# Patient Record
Sex: FEMALE | Race: OTHER | HISPANIC OR LATINO | Employment: FULL TIME | URBAN - METROPOLITAN AREA
[De-identification: names, ages, dates, MRNs, and addresses within clinical notes are randomized per-mention and may not be internally consistent; named-entity substitution may affect disease eponyms.]

---

## 2023-02-12 ENCOUNTER — APPOINTMENT (EMERGENCY)
Dept: CT IMAGING | Facility: HOSPITAL | Age: 33
End: 2023-02-12

## 2023-02-12 ENCOUNTER — HOSPITAL ENCOUNTER (EMERGENCY)
Facility: HOSPITAL | Age: 33
Discharge: HOME | End: 2023-02-12
Attending: INTERNAL MEDICINE

## 2023-02-12 VITALS
TEMPERATURE: 98.5 F | SYSTOLIC BLOOD PRESSURE: 132 MMHG | DIASTOLIC BLOOD PRESSURE: 78 MMHG | HEART RATE: 90 BPM | OXYGEN SATURATION: 100 % | RESPIRATION RATE: 18 BRPM | WEIGHT: 135.8 LBS
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
## 2023-02-12 DIAGNOSIS — S09.93XA FACIAL INJURY, INITIAL ENCOUNTER: ICD-10-CM

## 2023-02-12 DIAGNOSIS — S00.83XA CONTUSION OF FACE: ICD-10-CM

## 2023-02-12 LAB
EXT PREGNANCY TEST URINE: NEGATIVE
EXT. CONTROL: NORMAL

## 2023-02-12 RX ORDER — ACETAMINOPHEN 325 MG/1
975 TABLET ORAL ONCE
Status: COMPLETED | OUTPATIENT
Start: 2023-02-12 | End: 2023-02-12

## 2023-02-12 RX ORDER — LIDOCAINE 50 MG/G
1 PATCH TOPICAL ONCE
Status: DISCONTINUED | OUTPATIENT
Start: 2023-02-12 | End: 2023-02-12 | Stop reason: HOSPADM

## 2023-02-12 RX ORDER — IBUPROFEN 400 MG/1
600 TABLET ORAL EVERY 6 HOURS PRN
Qty: 30 TABLET | Refills: 0 | Status: SHIPPED | OUTPATIENT
Start: 2023-02-12

## 2023-02-12 RX ORDER — IBUPROFEN 600 MG/1
600 TABLET ORAL ONCE
Status: COMPLETED | OUTPATIENT
Start: 2023-02-12 | End: 2023-02-12

## 2023-02-12 RX ORDER — ACETAMINOPHEN 325 MG/1
TABLET ORAL
Status: COMPLETED
Start: 2023-02-12 | End: 2023-02-12

## 2023-02-12 RX ORDER — LIDOCAINE 50 MG/G
PATCH TOPICAL
Status: DISCONTINUED
Start: 2023-02-12 | End: 2023-02-12 | Stop reason: HOSPADM

## 2023-02-12 RX ADMIN — LIDOCAINE 1 PATCH: 50 PATCH TOPICAL at 01:10

## 2023-02-12 RX ADMIN — IBUPROFEN 600 MG: 600 TABLET, FILM COATED ORAL at 02:56

## 2023-02-12 RX ADMIN — ACETAMINOPHEN 975 MG: 325 TABLET ORAL at 01:11

## 2023-02-12 NOTE — ED NOTES
Patient seen by Dr Gene Mckinney with results of testing and discharge and follow up instructions reviewed with patient  Patient medicated with motrin prior to discharge       Brandie Gomez RN  02/12/23 8808
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
## 2023-10-16 ENCOUNTER — HOSPITAL ENCOUNTER (EMERGENCY)
Facility: HOSPITAL | Age: 33
Discharge: HOME | End: 2023-10-16
Attending: EMERGENCY MEDICINE

## 2023-10-16 ENCOUNTER — APPOINTMENT (EMERGENCY)
Dept: CT IMAGING | Facility: HOSPITAL | Age: 33
End: 2023-10-16

## 2023-10-16 DIAGNOSIS — R10.31 RIGHT LOWER QUADRANT ABDOMINAL PAIN: Primary | ICD-10-CM

## 2023-10-16 LAB
ANION GAP SERPL CALCULATED.3IONS-SCNC: 5 MMOL/L
BASOPHILS # BLD AUTO: 0.01 THOUSANDS/ÂΜL (ref 0–0.1)
BASOPHILS NFR BLD AUTO: 0 % (ref 0–1)
BILIRUB UR QL STRIP: NEGATIVE
BUN SERPL-MCNC: 12 MG/DL (ref 5–25)
CALCIUM SERPL-MCNC: 9.1 MG/DL (ref 8.4–10.2)
CHLORIDE SERPL-SCNC: 102 MMOL/L (ref 96–108)
CLARITY UR: CLEAR
CO2 SERPL-SCNC: 28 MMOL/L (ref 21–32)
COLOR UR: YELLOW
CREAT SERPL-MCNC: 0.56 MG/DL (ref 0.6–1.3)
EOSINOPHIL # BLD AUTO: 0.03 THOUSAND/ÂΜL (ref 0–0.61)
EOSINOPHIL NFR BLD AUTO: 0 % (ref 0–6)
ERYTHROCYTE [DISTWIDTH] IN BLOOD BY AUTOMATED COUNT: 11.9 % (ref 11.6–15.1)
EXT PREGNANCY TEST URINE: NEGATIVE
EXT. CONTROL: NORMAL
GFR SERPL CREATININE-BSD FRML MDRD: 123 ML/MIN/1.73SQ M
GLUCOSE SERPL-MCNC: 82 MG/DL (ref 65–140)
GLUCOSE UR STRIP-MCNC: NEGATIVE MG/DL
HCT VFR BLD AUTO: 40.2 % (ref 34.8–46.1)
HGB BLD-MCNC: 13.4 G/DL (ref 11.5–15.4)
HGB UR QL STRIP.AUTO: NEGATIVE
IMM GRANULOCYTES # BLD AUTO: 0.01 THOUSAND/UL (ref 0–0.2)
IMM GRANULOCYTES NFR BLD AUTO: 0 % (ref 0–2)
KETONES UR STRIP-MCNC: NEGATIVE MG/DL
LEUKOCYTE ESTERASE UR QL STRIP: NEGATIVE
LYMPHOCYTES # BLD AUTO: 2.21 THOUSANDS/ÂΜL (ref 0.6–4.47)
LYMPHOCYTES NFR BLD AUTO: 31 % (ref 14–44)
MCH RBC QN AUTO: 29.1 PG (ref 26.8–34.3)
MCHC RBC AUTO-ENTMCNC: 33.3 G/DL (ref 31.4–37.4)
MCV RBC AUTO: 87 FL (ref 82–98)
MONOCYTES # BLD AUTO: 0.53 THOUSAND/ÂΜL (ref 0.17–1.22)
MONOCYTES NFR BLD AUTO: 7 % (ref 4–12)
NEUTROPHILS # BLD AUTO: 4.36 THOUSANDS/ÂΜL (ref 1.85–7.62)
NEUTS SEG NFR BLD AUTO: 62 % (ref 43–75)
NITRITE UR QL STRIP: NEGATIVE
NRBC BLD AUTO-RTO: 0 /100 WBCS
PH UR STRIP.AUTO: 6.5 [PH]
PLATELET # BLD AUTO: 232 THOUSANDS/UL (ref 149–390)
PMV BLD AUTO: 10 FL (ref 8.9–12.7)
POTASSIUM SERPL-SCNC: 4.1 MMOL/L (ref 3.5–5.3)
PROT UR STRIP-MCNC: NEGATIVE MG/DL
RBC # BLD AUTO: 4.6 MILLION/UL (ref 3.81–5.12)
SODIUM SERPL-SCNC: 135 MMOL/L (ref 135–147)
SP GR UR STRIP.AUTO: 1.02 (ref 1–1.03)
UROBILINOGEN UR QL STRIP.AUTO: 0.2 E.U./DL
WBC # BLD AUTO: 7.15 THOUSAND/UL (ref 4.31–10.16)

## 2023-10-16 PROCEDURE — 80048 BASIC METABOLIC PNL TOTAL CA: CPT | Performed by: EMERGENCY MEDICINE

## 2023-10-16 PROCEDURE — 81003 URINALYSIS AUTO W/O SCOPE: CPT | Performed by: EMERGENCY MEDICINE

## 2023-10-16 PROCEDURE — 85025 COMPLETE CBC W/AUTO DIFF WBC: CPT | Performed by: EMERGENCY MEDICINE

## 2023-10-16 PROCEDURE — 36415 COLL VENOUS BLD VENIPUNCTURE: CPT | Performed by: EMERGENCY MEDICINE

## 2023-10-16 PROCEDURE — 81025 URINE PREGNANCY TEST: CPT | Performed by: EMERGENCY MEDICINE

## 2023-10-16 PROCEDURE — 74177 CT ABD & PELVIS W/CONTRAST: CPT

## 2023-10-16 PROCEDURE — G1004 CDSM NDSC: HCPCS

## 2023-10-16 PROCEDURE — 81514 NFCT DS BV&VAGINITIS DNA ALG: CPT | Performed by: EMERGENCY MEDICINE

## 2023-10-16 PROCEDURE — 87491 CHLMYD TRACH DNA AMP PROBE: CPT | Performed by: EMERGENCY MEDICINE

## 2023-10-16 PROCEDURE — 87591 N.GONORRHOEAE DNA AMP PROB: CPT | Performed by: EMERGENCY MEDICINE

## 2023-10-16 RX ORDER — ONDANSETRON 2 MG/ML
4 INJECTION INTRAMUSCULAR; INTRAVENOUS ONCE
Status: COMPLETED | OUTPATIENT
Start: 2023-10-16 | End: 2023-10-16

## 2023-10-16 RX ORDER — IBUPROFEN 600 MG/1
600 TABLET ORAL EVERY 6 HOURS PRN
Qty: 30 TABLET | Refills: 0 | Status: SHIPPED | OUTPATIENT
Start: 2023-10-16

## 2023-10-16 RX ORDER — KETOROLAC TROMETHAMINE 30 MG/ML
15 INJECTION, SOLUTION INTRAMUSCULAR; INTRAVENOUS ONCE
Status: COMPLETED | OUTPATIENT
Start: 2023-10-16 | End: 2023-10-16

## 2023-10-16 RX ADMIN — IOHEXOL 85 ML: 350 INJECTION, SOLUTION INTRAVENOUS at 19:16

## 2023-10-16 RX ADMIN — KETOROLAC TROMETHAMINE 15 MG: 30 INJECTION, SOLUTION INTRAMUSCULAR; INTRAVENOUS at 18:48

## 2023-10-16 RX ADMIN — ONDANSETRON 4 MG: 2 INJECTION INTRAMUSCULAR; INTRAVENOUS at 18:48

## 2023-10-16 NOTE — ED PROVIDER NOTES
History  Chief Complaint   Patient presents with    Abdominal Pain     Abdominal pain rlq x2 days. Reports nausea     46-year-old female presents to the emergency department for evaluation of abdominal pain. The patient is German speaking only and an  was used for translation. The patient reports over the past 2 days she has had constant right lower quadrant pain that she describes as moderate, nonradiating and sharp. She states she has associated nausea without vomiting. She has not taken any medications to treat her symptoms prior to arrival.  The patient also states that over the past several days she has had a white vaginal discharge. The patient reports that her last period was September 23, 2023 and was normal for her. No new sexual partners. She denies fever, chills, vomiting, sick contacts, recent travel, urinary symptoms or genital lesions. Prior to Admission Medications   Prescriptions Last Dose Informant Patient Reported? Taking?   ibuprofen (MOTRIN) 400 mg tablet   No No   Sig: Take 1.5 tablets (600 mg total) by mouth every 6 (six) hours as needed for mild pain      Facility-Administered Medications: None       Past Medical History:   Diagnosis Date    Asthma        History reviewed. No pertinent surgical history. History reviewed. No pertinent family history. I have reviewed and agree with the history as documented. E-Cigarette/Vaping    E-Cigarette Use Never User      E-Cigarette/Vaping Substances     Social History     Tobacco Use    Smoking status: Never    Smokeless tobacco: Never   Vaping Use    Vaping Use: Never used   Substance Use Topics    Alcohol use: Never    Drug use: Never       Review of Systems   Constitutional:  Negative for chills and fever. HENT:  Negative for ear pain and sore throat. Eyes:  Negative for pain and visual disturbance. Respiratory:  Negative for cough and shortness of breath.     Cardiovascular:  Negative for chest pain and palpitations. Gastrointestinal:  Positive for abdominal pain and nausea. Negative for vomiting. Genitourinary:  Positive for vaginal discharge. Negative for dysuria and hematuria. Musculoskeletal:  Negative for arthralgias and back pain. Skin:  Negative for color change and rash. Neurological:  Negative for seizures and syncope. All other systems reviewed and are negative. Physical Exam  Physical Exam  Vitals and nursing note reviewed. Constitutional:       General: She is not in acute distress. Appearance: She is well-developed. HENT:      Head: Normocephalic and atraumatic. Eyes:      Conjunctiva/sclera: Conjunctivae normal.   Cardiovascular:      Rate and Rhythm: Normal rate and regular rhythm. Heart sounds: No murmur heard. Pulmonary:      Effort: Pulmonary effort is normal. No respiratory distress. Breath sounds: Normal breath sounds. Abdominal:      Palpations: Abdomen is soft. Tenderness: There is abdominal tenderness in the right lower quadrant. There is no guarding or rebound. Musculoskeletal:         General: No swelling. Cervical back: Neck supple. Skin:     General: Skin is warm and dry. Capillary Refill: Capillary refill takes less than 2 seconds. Neurological:      Mental Status: She is alert.    Psychiatric:         Mood and Affect: Mood normal.         Vital Signs  ED Triage Vitals   Temperature Pulse Respirations Blood Pressure SpO2   10/16/23 1751 10/16/23 1751 10/16/23 1751 10/16/23 1751 10/16/23 1751   98.2 °F (36.8 °C) 79 12 118/83 98 %      Temp Source Heart Rate Source Patient Position - Orthostatic VS BP Location FiO2 (%)   10/16/23 1751 10/16/23 1751 10/16/23 1751 10/16/23 1751 --   Oral Monitor Sitting Left arm       Pain Score       10/16/23 1848       5           Vitals:    10/16/23 1751   BP: 118/83   Pulse: 79   Patient Position - Orthostatic VS: Sitting         Visual Acuity      ED Medications  Medications   ketorolac (TORADOL) injection 15 mg (15 mg Intravenous Given 10/16/23 1848)   ondansetron (ZOFRAN) injection 4 mg (4 mg Intravenous Given 10/16/23 1848)   iohexol (OMNIPAQUE) 350 MG/ML injection (MULTI-DOSE) 100 mL (85 mL Intravenous Given 10/16/23 1916)       Diagnostic Studies  Results Reviewed       Procedure Component Value Units Date/Time    Basic metabolic panel [[id number]]  (Abnormal) Collected: 10/16/23 1847    Lab Status: Final result Specimen: Blood from Arm, Left Updated: 10/16/23 1915     Sodium 135 mmol/L      Potassium 4.1 mmol/L      Chloride 102 mmol/L      CO2 28 mmol/L      ANION GAP 5 mmol/L      BUN 12 mg/dL      Creatinine 0.56 mg/dL      Glucose 82 mg/dL      Calcium 9.1 mg/dL      eGFR 123 ml/min/1.73sq m     Narrative:      [facility] guidelines for Chronic Kidney Disease (CKD):     Stage 1 with normal or high GFR (GFR > 90 mL/min/1.73 square meters)    Stage 2 Mild CKD (GFR = 60-89 mL/min/1.73 square meters)    Stage 3A Moderate CKD (GFR = 45-59 mL/min/1.73 square meters)    Stage 3B Moderate CKD (GFR = 30-44 mL/min/1.73 square meters)    Stage 4 Severe CKD (GFR = 15-29 mL/min/1.73 square meters)    Stage 5 End Stage CKD (GFR <15 mL/min/1.73 square meters)  Note: GFR calculation is accurate only with a steady state creatinine    UA w Reflex to Microscopic w Reflex to Culture [[id number]]  (Normal) Collected: 10/16/23 1847    Lab Status: Final result Specimen: Urine, Clean Catch Updated: 10/16/23 1902     Color, UA Yellow     Clarity, UA Clear     Specific Gravity, UA 1.025     pH, UA 6.5     Leukocytes, UA Negative     Nitrite, UA Negative     Protein, UA Negative mg/dl      Glucose, UA Negative mg/dl      Ketones, UA Negative mg/dl      Urobilinogen, UA 0.2 E.U./dl      Bilirubin, UA Negative     Occult Blood, UA Negative    Molecular Vaginal Panel [[id number]] Collected: 10/16/23 1853    Lab Status:  In process Specimen: Genital from Vaginal Updated: 10/16/23 1858    CBC and differential [[id number]] Collected: 10/16/23 1847    Lab Status: Final result Specimen: Blood from Arm, Left Updated: 10/16/23 1858     WBC 7.15 Thousand/uL      RBC 4.60 Million/uL      Hemoglobin 13.4 g/dL      Hematocrit 40.2 %      MCV 87 fL      MCH 29.1 pg      MCHC 33.3 g/dL      RDW 11.9 %      MPV 10.0 fL      Platelets 679 Thousands/uL      nRBC 0 /100 WBCs      Neutrophils Relative 62 %      Immat GRANS % 0 %      Lymphocytes Relative 31 %      Monocytes Relative 7 %      Eosinophils Relative 0 %      Basophils Relative 0 %      Neutrophils Absolute 4.36 Thousands/µL      Immature Grans Absolute 0.01 Thousand/uL      Lymphocytes Absolute 2.21 Thousands/µL      Monocytes Absolute 0.53 Thousand/µL      Eosinophils Absolute 0.03 Thousand/µL      Basophils Absolute 0.01 Thousands/µL     POCT pregnancy, urine [[id number]]  (Normal) Resulted: 10/16/23 1855    Lab Status: Final result Updated: 10/16/23 1856     EXT Preg Test, Ur Negative     Control Valid    Chlamydia/GC amplified DNA by PCR [[id number]] Collected: 10/16/23 1847    Lab Status: In process Specimen: Urine, Other Updated: 10/16/23 1855                   CT abdomen pelvis with contrast   Final Result by Ricco Tovar MD (10/16 1953)      No acute findings. Workstation performed: [id number]                    Procedures  Procedures         ED Course               SBIRT 20yo+      Flowsheet Row Most Recent Value   Initial Alcohol Screen: US AUDIT-C     1. How often do you have a drink containing alcohol? 0 Filed at: 10/16/2023 1754   2. How many drinks containing alcohol do you have on a typical day you are drinking? 0 Filed at: 10/16/2023 1754   3a. Male UNDER 65: How often do you have five or more drinks on one occasion? 0 Filed at: 10/16/2023 1754   3b. FEMALE Any Age, or MALE 65+: How often do you have 4 or more drinks on one occassion?  0 Filed at: 10/16/2023 1754   Audit-C Score 0 Filed at: 10/16/2023 1754   TIM: How many times in the past year have you. .. Used an illegal drug or used a prescription medication for non-medical reasons? Never Filed at: 10/16/2023 1754                      Medical Decision Making  52-year-old female presented to the emergency department for evaluation of abdominal pain. On arrival the patient was awake, alert, oriented and in no acute distress. Initial vital signs are within normal limits. On exam the patient had tenderness to palpation of her right lower quadrant. Physical exam was otherwise unremarkable. No peritoneal signs were present. The patient was treated with a dose of Zofran and Toradol. Blood work was reassuring. CT scan of the patient's abdomen and pelvis was ordered to evaluate for acute pathology including but not limited to obstruction, perforation, appendicitis, cyst.  CT scan with no acute findings. All diagnostic studies were discussed with the patient in detail. Patient reported significant improvement of symptoms on reevaluation. Patient is appropriate for discharge. Recommendation was made for the patient to follow-up with her PCP and OB/GYN for continued symptoms. Testing for STIs is pending. Return precautions were discussed. Patient agrees with the plan for discharge and feels comfortable to go home with proper f/u. Advised to return for worsening or additional problems. Diagnostic tests were reviewed and questions answered. Diagnosis, care plan and treatment options were discussed. The patient understands instructions and will follow up as directed. Amount and/or Complexity of Data Reviewed  Labs: ordered. Radiology: ordered. Risk  Prescription drug management.              Disposition  Final diagnoses:   Right lower quadrant abdominal pain     Time reflects when diagnosis was documented in both MDM as applicable and the Disposition within this note       Time User Action Codes Description Comment    10/16/2023  8:35 PM Joni Maldonado [R10.31] Right lower quadrant abdominal pain           ED Disposition       ED Disposition   Discharge    Condition   Stable    Date/Time   Mon Oct 16, 2023 2035    Comment   Kimberly Haney discharge to home/self care. Follow-up Information       Follow up With Specialties Details Why Contact Info Additional [address] [facility] Schedule an appointment as soon as possible for a visit   [address] [facility] [address] [facility] Gynecology Schedule an appointment as soon as possible for a visit   [address] [phone] [address], [address], [place], [address] [place] [address]    [address] Emergency Department Emergency Medicine Go to  If symptoms worsen [address] [phone] [address] [facility] Emergency Department, [address] [place] [address] Dr, [address] [facility]            Discharge Medication List as of 10/16/2023  8:41 PM        CONTINUE these medications which have CHANGED    Details   ibuprofen (MOTRIN) 600 mg tablet Take 1 tablet (600 mg total) by mouth every 6 (six) hours as needed for mild pain or moderate pain, Starting Mon 10/16/2023, Normal             No discharge procedures on file.     PDMP Review       None            ED Provider  Electronically Signed by             Clem Espino MD  10/17/23 0150

## 2023-10-17 VITALS
DIASTOLIC BLOOD PRESSURE: 83 MMHG | WEIGHT: 135.8 LBS | RESPIRATION RATE: 12 BRPM | TEMPERATURE: 98.2 F | OXYGEN SATURATION: 98 % | SYSTOLIC BLOOD PRESSURE: 118 MMHG | HEART RATE: 79 BPM

## 2023-10-17 LAB
C GLABRATA DNA VAG QL NAA+PROBE: NEGATIVE
C KRUSEI DNA VAG QL NAA+PROBE: NEGATIVE
C TRACH DNA SPEC QL NAA+PROBE: NEGATIVE
CANDIDA SP 6 PNL VAG NAA+PROBE: POSITIVE
N GONORRHOEA DNA SPEC QL NAA+PROBE: NEGATIVE
T VAGINALIS DNA VAG QL NAA+PROBE: NEGATIVE
VAGINOSIS/ITIS DNA PNL VAG PROBE+SIG AMP: NEGATIVE

## 2024-01-10 ENCOUNTER — HOSPITAL ENCOUNTER (EMERGENCY)
Facility: HOSPITAL | Age: 34
Discharge: HOME | End: 2024-01-10
Attending: EMERGENCY MEDICINE

## 2024-01-10 ENCOUNTER — APPOINTMENT (EMERGENCY)
Dept: RADIOLOGY | Facility: HOSPITAL | Age: 34
End: 2024-01-10

## 2024-01-10 VITALS
TEMPERATURE: 98.1 F | DIASTOLIC BLOOD PRESSURE: 83 MMHG | HEART RATE: 98 BPM | SYSTOLIC BLOOD PRESSURE: 144 MMHG | RESPIRATION RATE: 18 BRPM | OXYGEN SATURATION: 96 %

## 2024-01-10 DIAGNOSIS — J06.9 VIRAL URI WITH COUGH: Primary | ICD-10-CM

## 2024-01-10 LAB
FLUAV RNA RESP QL NAA+PROBE: NEGATIVE
FLUBV RNA RESP QL NAA+PROBE: NEGATIVE
RSV RNA RESP QL NAA+PROBE: NEGATIVE
SARS-COV-2 RNA RESP QL NAA+PROBE: NEGATIVE

## 2024-01-10 PROCEDURE — 71045 X-RAY EXAM CHEST 1 VIEW: CPT

## 2024-01-10 PROCEDURE — 99283 EMERGENCY DEPT VISIT LOW MDM: CPT

## 2024-01-10 PROCEDURE — 0241U HB NFCT DS VIR RESP RNA 4 TRGT: CPT | Performed by: EMERGENCY MEDICINE

## 2024-01-10 PROCEDURE — 99284 EMERGENCY DEPT VISIT MOD MDM: CPT | Performed by: EMERGENCY MEDICINE

## 2024-01-10 RX ORDER — ALBUTEROL SULFATE 90 UG/1
2 AEROSOL, METERED RESPIRATORY (INHALATION) EVERY 4 HOURS PRN
Qty: 6.7 G | Refills: 0 | Status: SHIPPED | OUTPATIENT
Start: 2024-01-10

## 2024-01-10 RX ORDER — IPRATROPIUM BROMIDE AND ALBUTEROL SULFATE 2.5; .5 MG/3ML; MG/3ML
3 SOLUTION RESPIRATORY (INHALATION)
Status: DISCONTINUED | OUTPATIENT
Start: 2024-01-10 | End: 2024-01-10 | Stop reason: HOSPADM

## 2024-01-10 RX ORDER — GUAIFENESIN/DEXTROMETHORPHAN 100-10MG/5
10 SYRUP ORAL ONCE
Status: COMPLETED | OUTPATIENT
Start: 2024-01-10 | End: 2024-01-10

## 2024-01-10 RX ORDER — FLUTICASONE PROPIONATE 50 MCG
1 SPRAY, SUSPENSION (ML) NASAL DAILY
Status: DISCONTINUED | OUTPATIENT
Start: 2024-01-10 | End: 2024-01-10 | Stop reason: HOSPADM

## 2024-01-10 RX ORDER — GUAIFENESIN/DEXTROMETHORPHAN 100-10MG/5
5 SYRUP ORAL 3 TIMES DAILY PRN
Qty: 118 ML | Refills: 0 | Status: SHIPPED | OUTPATIENT
Start: 2024-01-10

## 2024-01-10 RX ADMIN — FLUTICASONE PROPIONATE 1 SPRAY: 50 SPRAY, METERED NASAL at 11:45

## 2024-01-10 RX ADMIN — GUAIFENESIN AND DEXTROMETHORPHAN 10 ML: 100; 10 SYRUP ORAL at 11:45

## 2024-01-10 NOTE — Clinical Note
Kimberly Haney was seen and treated in our emergency department on 1/10/2024.                Diagnosis:     Kimberly  may return to school on return date.    She may return on this date: 01/12/2024         If you have any questions or concerns, please don't hesitate to call.      Sharon Alvarado, DO    ______________________________           _______________          _______________  Hospital Representative                              Date                                Time

## 2024-01-10 NOTE — ED PROVIDER NOTES
History  Chief Complaint   Patient presents with    Cough     Pt presents to the ed with a cough for the past four days, reports taking tylenol and other medications for cough with no relief      33-year-old female comes in for evaluation of cough.  States her symptoms began approximately 4 days ago.  She has been taking over-the-counter medications without relief.  Patient also has a history of asthma and states her inhaler has run out.  Denies any fever at home.  No known sick contacts.  Denies any nausea or vomiting chills or chest pain.      History provided by:  Patient   used: Yes    Cough  Cough characteristics:  Non-productive  Sputum characteristics:  Nondescript  Severity:  Moderate  Onset quality:  Sudden  Duration:  4 days  Timing:  Constant  Progression:  Worsening  Chronicity:  New  Smoker: no    Context: upper respiratory infection    Ineffective treatments:  None tried  Associated symptoms: wheezing    Associated symptoms: no chest pain, no chills, no ear pain, no fever, no myalgias, no rash, no shortness of breath and no sore throat    Risk factors: recent infection        Prior to Admission Medications   Prescriptions Last Dose Informant Patient Reported? Taking?   ibuprofen (MOTRIN) 600 mg tablet   No No   Sig: Take 1 tablet (600 mg total) by mouth every 6 (six) hours as needed for mild pain or moderate pain      Facility-Administered Medications: None       Past Medical History:   Diagnosis Date    Asthma        History reviewed. No pertinent surgical history.    History reviewed. No pertinent family history.  I have reviewed and agree with the history as documented.    E-Cigarette/Vaping    E-Cigarette Use Never User      E-Cigarette/Vaping Substances     Social History     Tobacco Use    Smoking status: Never    Smokeless tobacco: Never   Vaping Use    Vaping status: Never Used   Substance Use Topics    Alcohol use: Never    Drug use: Never       Review of Systems    Constitutional:  Negative for chills and fever.   HENT:  Negative for ear pain and sore throat.    Eyes:  Negative for pain and visual disturbance.   Respiratory:  Positive for cough and wheezing. Negative for shortness of breath.    Cardiovascular:  Negative for chest pain and palpitations.   Gastrointestinal:  Negative for abdominal pain and vomiting.   Genitourinary:  Negative for dysuria and hematuria.   Musculoskeletal:  Negative for arthralgias, back pain and myalgias.   Skin:  Negative for color change and rash.   Neurological:  Negative for seizures and syncope.   All other systems reviewed and are negative.      Physical Exam  Physical Exam  Vitals and nursing note reviewed.   Constitutional:       General: She is not in acute distress.     Appearance: She is well-developed.   HENT:      Head: Normocephalic and atraumatic.   Eyes:      Conjunctiva/sclera: Conjunctivae normal.   Cardiovascular:      Rate and Rhythm: Normal rate and regular rhythm.      Heart sounds: No murmur heard.  Pulmonary:      Effort: Pulmonary effort is normal. No respiratory distress.      Breath sounds: Wheezing present.   Abdominal:      Palpations: Abdomen is soft.      Tenderness: There is no abdominal tenderness.   Musculoskeletal:         General: No swelling.      Cervical back: Neck supple.   Skin:     General: Skin is warm and dry.      Capillary Refill: Capillary refill takes less than 2 seconds.   Neurological:      Mental Status: She is alert.   Psychiatric:         Mood and Affect: Mood normal.         Vital Signs  ED Triage Vitals [01/10/24 1113]   Temperature Pulse Respirations Blood Pressure SpO2   98.1 °F (36.7 °C) 98 18 144/83 96 %      Temp Source Heart Rate Source Patient Position - Orthostatic VS BP Location FiO2 (%)   Oral Monitor Sitting Right arm --      Pain Score       --           Vitals:    01/10/24 1113   BP: 144/83   Pulse: 98   Patient Position - Orthostatic VS: Sitting         Visual Acuity      ED  Medications  Medications   dextromethorphan-guaiFENesin (ROBITUSSIN DM) oral syrup 10 mL (10 mL Oral Given 1/10/24 1145)       Diagnostic Studies  Results Reviewed       Procedure Component Value Units Date/Time    FLU/RSV/COVID - if FLU/RSV clinically relevant [[id number]]  (Normal) Collected: 01/10/24 1137    Lab Status: Final result Specimen: Nares from Nose Updated: 01/10/24 1234     SARS-CoV-2 Negative     INFLUENZA A PCR Negative     INFLUENZA B PCR Negative     RSV PCR Negative    Narrative:      FOR PEDIATRIC PATIENTS - copy/paste COVID Guidelines URL to browser: [email or web address]    SARS-CoV-2 assay is a Nucleic Acid Amplification assay intended for the  qualitative detection of nucleic acid from SARS-CoV-2 in nasopharyngeal  swabs. Results are for the presumptive identification of SARS-CoV-2 RNA.    Positive results are indicative of infection with SARS-CoV-2, the virus  causing COVID-19, but do not rule out bacterial infection or co-infection  with other viruses. Laboratories within the [place] and its  territories are required to report all positive results to the appropriate  public health authorities. Negative results do not preclude SARS-CoV-2  infection and should not be used as the sole basis for treatment or other  patient management decisions. Negative results must be combined with  clinical observations, patient history, and epidemiological information.  This test has not been FDA cleared or approved.    This test has been authorized by FDA under an Emergency Use Authorization  (EUA). This test is only authorized for the duration of time the  declaration that circumstances exist justifying the authorization of the  emergency use of an in vitro diagnostic tests for detection of SARS-CoV-2  virus and/or diagnosis of COVID-19 infection under section 564(b)(1) of  the Act, 21 U.S.C. 360bbb-3(b)(1), unless the authorization is  terminated  or revoked sooner. The test has been validated but independent review by FDA  and CLIA is pending.    Test performed using [employer] ([employer]) GeneXpert: This RT-PCR assay targets N2,  a region unique to SARS-CoV-2. A conserved region in the E-gene was chosen  for pan-Sarbecovirus detection which includes SARS-CoV-2.    According to CMS-2020-01-R, this platform meets the definition of high-throughput technology.                   XR chest 1 view portable   Final Result by Vernon Salmeron DO (01/10 1455)      No acute cardiopulmonary disease.                  Resident: DELFIN HORTON I, the attending radiologist, have reviewed the images and agree with the final report above.      Workstation performed: [id number]                    Procedures  Procedures         ED Course                               SBIRT 22yo+      Flowsheet Row Most Recent Value   Initial Alcohol Screen: US AUDIT-C     1. How often do you have a drink containing alcohol? 0 Filed at: 01/10/2024 1112   2. How many drinks containing alcohol do you have on a typical day you are drinking?  0 Filed at: 01/10/2024 1112   3a. Male UNDER 65: How often do you have five or more drinks on one occasion? 0 Filed at: 01/10/2024 1112   3b. FEMALE Any Age, or MALE 65+: How often do you have 4 or more drinks on one occassion? 0 Filed at: 01/10/2024 1112   Audit-C Score 0 Filed at: 01/10/2024 1112   TIM: How many times in the past year have you...    Used an illegal drug or used a prescription medication for non-medical reasons? Never Filed at: 01/10/2024 1112                      Medical Decision Making  Differential diagnosis includes but is not limited to URI, viral illness such as COVID flu or RSV, pneumonia, acute bronchitis, asthma exacerbation    Problems Addressed:  Viral URI with cough: acute illness or injury    Amount and/or Complexity of Data Reviewed  Labs:  Decision-making details documented in ED Course.  Radiology: ordered and  independent interpretation performed.     Details: Chest x-ray within normal limits no pneumonia appreciated    Risk  OTC drugs.  Prescription drug management.  Risk Details: Patient may take Motrin or Tylenol as needed for pain or fever.  Patient given albuterol inhaler and cough medicine and to follow-up with PCP             Disposition  Final diagnoses:   Viral URI with cough     Time reflects when diagnosis was documented in both MDM as applicable and the Disposition within this note       Time User Action Codes Description Comment    1/10/2024 12:23 PM Sharon Alvarado Add [J06.9] Viral URI with cough           ED Disposition       ED Disposition   Discharge    Condition   Stable    Date/Time   Wed Miguel 10, 2024 1253    Comment   Kimberly Haney discharge to home/self care.                   Follow-up Information       Follow up With Specialties Details Why Contact Info    [facility] Schedule an appointment as soon as possible for a visit   [address] [place] [address]  [phone]              Discharge Medication List as of 1/10/2024 12:53 PM        START taking these medications    Details   albuterol (ProAir HFA) 90 mcg/act inhaler Inhale 2 puffs every 4 (four) hours as needed for wheezing, Starting Wed 1/10/2024, Normal      dextromethorphan-guaiFENesin (ROBITUSSIN DM)  mg/5 mL syrup Take 5 mL by mouth 3 (three) times a day as needed for cough, Starting Wed 1/10/2024, Normal           CONTINUE these medications which have NOT CHANGED    Details   ibuprofen (MOTRIN) 600 mg tablet Take 1 tablet (600 mg total) by mouth every 6 (six) hours as needed for mild pain or moderate pain, Starting Mon 10/16/2023, Normal             No discharge procedures on file.    PDMP Review       None            ED Provider  Electronically Signed by             Sharon Alvarado,   01/10/24 8681

## 2024-07-23 ENCOUNTER — APPOINTMENT (EMERGENCY)
Dept: ULTRASOUND IMAGING | Facility: HOSPITAL | Age: 34
End: 2024-07-23
Payer: COMMERCIAL

## 2024-07-23 ENCOUNTER — HOSPITAL ENCOUNTER (EMERGENCY)
Facility: HOSPITAL | Age: 34
Discharge: HOME | End: 2024-07-23
Attending: INTERNAL MEDICINE
Payer: COMMERCIAL

## 2024-07-23 VITALS
DIASTOLIC BLOOD PRESSURE: 76 MMHG | OXYGEN SATURATION: 98 % | SYSTOLIC BLOOD PRESSURE: 131 MMHG | RESPIRATION RATE: 14 BRPM | HEART RATE: 103 BPM | WEIGHT: 139.4 LBS | TEMPERATURE: 98.2 F

## 2024-07-23 DIAGNOSIS — R10.2 PELVIC PAIN: Primary | ICD-10-CM

## 2024-07-23 LAB
ALBUMIN SERPL BCG-MCNC: 4.6 G/DL (ref 3.5–5)
ALP SERPL-CCNC: 39 U/L (ref 34–104)
ALT SERPL W P-5'-P-CCNC: 10 U/L (ref 7–52)
ANION GAP SERPL CALCULATED.3IONS-SCNC: 9 MMOL/L (ref 4–13)
AST SERPL W P-5'-P-CCNC: 16 U/L (ref 13–39)
BASOPHILS # BLD AUTO: 0.02 THOUSANDS/ÂΜL (ref 0–0.1)
BASOPHILS NFR BLD AUTO: 0 % (ref 0–1)
BILIRUB SERPL-MCNC: 0.43 MG/DL (ref 0.2–1)
BILIRUB UR QL STRIP: NEGATIVE
BUN SERPL-MCNC: 10 MG/DL (ref 5–25)
CALCIUM SERPL-MCNC: 9.4 MG/DL (ref 8.4–10.2)
CHLORIDE SERPL-SCNC: 101 MMOL/L (ref 96–108)
CLARITY UR: CLEAR
CO2 SERPL-SCNC: 26 MMOL/L (ref 21–32)
COLOR UR: YELLOW
CREAT SERPL-MCNC: 0.63 MG/DL (ref 0.6–1.3)
EOSINOPHIL # BLD AUTO: 0.01 THOUSAND/ÂΜL (ref 0–0.61)
EOSINOPHIL NFR BLD AUTO: 0 % (ref 0–6)
ERYTHROCYTE [DISTWIDTH] IN BLOOD BY AUTOMATED COUNT: 12 % (ref 11.6–15.1)
EXT PREGNANCY TEST URINE: NEGATIVE
EXT. CONTROL: NORMAL
GFR SERPL CREATININE-BSD FRML MDRD: 118 ML/MIN/1.73SQ M
GLUCOSE SERPL-MCNC: 90 MG/DL (ref 65–140)
GLUCOSE UR STRIP-MCNC: NEGATIVE MG/DL
HCT VFR BLD AUTO: 44.4 % (ref 34.8–46.1)
HGB BLD-MCNC: 14.5 G/DL (ref 11.5–15.4)
HGB UR QL STRIP.AUTO: NEGATIVE
IMM GRANULOCYTES # BLD AUTO: 0.03 THOUSAND/UL (ref 0–0.2)
IMM GRANULOCYTES NFR BLD AUTO: 0 % (ref 0–2)
KETONES UR STRIP-MCNC: NEGATIVE MG/DL
LEUKOCYTE ESTERASE UR QL STRIP: NEGATIVE
LYMPHOCYTES # BLD AUTO: 1.27 THOUSANDS/ÂΜL (ref 0.6–4.47)
LYMPHOCYTES NFR BLD AUTO: 11 % (ref 14–44)
MCH RBC QN AUTO: 29.4 PG (ref 26.8–34.3)
MCHC RBC AUTO-ENTMCNC: 32.7 G/DL (ref 31.4–37.4)
MCV RBC AUTO: 90 FL (ref 82–98)
MONOCYTES # BLD AUTO: 0.6 THOUSAND/ÂΜL (ref 0.17–1.22)
MONOCYTES NFR BLD AUTO: 5 % (ref 4–12)
NEUTROPHILS # BLD AUTO: 9.76 THOUSANDS/ÂΜL (ref 1.85–7.62)
NEUTS SEG NFR BLD AUTO: 84 % (ref 43–75)
NITRITE UR QL STRIP: NEGATIVE
NRBC BLD AUTO-RTO: 0 /100 WBCS
PH UR STRIP.AUTO: 6.5 [PH]
PLATELET # BLD AUTO: 255 THOUSANDS/UL (ref 149–390)
PMV BLD AUTO: 9.6 FL (ref 8.9–12.7)
POTASSIUM SERPL-SCNC: 4 MMOL/L (ref 3.5–5.3)
PROT SERPL-MCNC: 8 G/DL (ref 6.4–8.4)
PROT UR STRIP-MCNC: NEGATIVE MG/DL
RBC # BLD AUTO: 4.93 MILLION/UL (ref 3.81–5.12)
SODIUM SERPL-SCNC: 136 MMOL/L (ref 135–147)
SP GR UR STRIP.AUTO: 1.01 (ref 1–1.03)
UROBILINOGEN UR QL STRIP.AUTO: 0.2 E.U./DL
WBC # BLD AUTO: 11.69 THOUSAND/UL (ref 4.31–10.16)

## 2024-07-23 PROCEDURE — 96361 HYDRATE IV INFUSION ADD-ON: CPT

## 2024-07-23 PROCEDURE — 81003 URINALYSIS AUTO W/O SCOPE: CPT | Performed by: INTERNAL MEDICINE

## 2024-07-23 PROCEDURE — 36415 COLL VENOUS BLD VENIPUNCTURE: CPT | Performed by: INTERNAL MEDICINE

## 2024-07-23 PROCEDURE — 76856 US EXAM PELVIC COMPLETE: CPT

## 2024-07-23 PROCEDURE — 81025 URINE PREGNANCY TEST: CPT | Performed by: INTERNAL MEDICINE

## 2024-07-23 PROCEDURE — 99284 EMERGENCY DEPT VISIT MOD MDM: CPT

## 2024-07-23 PROCEDURE — 80053 COMPREHEN METABOLIC PANEL: CPT | Performed by: INTERNAL MEDICINE

## 2024-07-23 PROCEDURE — 96374 THER/PROPH/DIAG INJ IV PUSH: CPT

## 2024-07-23 PROCEDURE — 85025 COMPLETE CBC W/AUTO DIFF WBC: CPT | Performed by: INTERNAL MEDICINE

## 2024-07-23 PROCEDURE — 76830 TRANSVAGINAL US NON-OB: CPT

## 2024-07-23 PROCEDURE — 99284 EMERGENCY DEPT VISIT MOD MDM: CPT | Performed by: INTERNAL MEDICINE

## 2024-07-23 RX ORDER — KETOROLAC TROMETHAMINE 30 MG/ML
15 INJECTION, SOLUTION INTRAMUSCULAR; INTRAVENOUS ONCE
Status: DISCONTINUED | OUTPATIENT
Start: 2024-07-23 | End: 2024-07-23 | Stop reason: HOSPADM

## 2024-07-23 RX ORDER — KETOROLAC TROMETHAMINE 30 MG/ML
15 INJECTION, SOLUTION INTRAMUSCULAR; INTRAVENOUS ONCE
Status: COMPLETED | OUTPATIENT
Start: 2024-07-23 | End: 2024-07-23

## 2024-07-23 RX ADMIN — SODIUM CHLORIDE 1000 ML: 0.9 INJECTION, SOLUTION INTRAVENOUS at 08:57

## 2024-07-23 RX ADMIN — KETOROLAC TROMETHAMINE 15 MG: 30 INJECTION, SOLUTION INTRAMUSCULAR at 11:26

## 2024-07-23 NOTE — ED PROVIDER NOTES
History  Chief Complaint   Patient presents with    Abdominal Pain     Abdominal pain in stomach and lower belly beginning Sunday.  LMP 7/7.  Reports pain with urination     This is a 34 years old came for having abdominal pain for 2 days.  Patient stated the abdominal pain is in the lower part and going into the suprapubic area.  Has no fever, vomiting, diarrhea.  Last BM was yesterday morning.  Patient denies any urinary symptoms.  Patient denies vaginal bleeding or discharge.  LMP 7/11/2024.  Patient is stated she has regular menstruation.  Patient denies previous abdominal surgery.  Last meal was last night.  Patient has history of asthma.  Patient denies any flank pain.  Patient denies smoking but she drinks alcohol socially.        Prior to Admission Medications   Prescriptions Last Dose Informant Patient Reported? Taking?   albuterol (ProAir HFA) 90 mcg/act inhaler   No No   Sig: Inhale 2 puffs every 4 (four) hours as needed for wheezing   dextromethorphan-guaiFENesin (ROBITUSSIN DM)  mg/5 mL syrup   No No   Sig: Take 5 mL by mouth 3 (three) times a day as needed for cough   ibuprofen (MOTRIN) 600 mg tablet   No No   Sig: Take 1 tablet (600 mg total) by mouth every 6 (six) hours as needed for mild pain or moderate pain      Facility-Administered Medications: None       Past Medical History:   Diagnosis Date    Asthma        History reviewed. No pertinent surgical history.    History reviewed. No pertinent family history.  I have reviewed and agree with the history as documented.    E-Cigarette/Vaping    E-Cigarette Use Never User      E-Cigarette/Vaping Substances     Social History     Tobacco Use    Smoking status: Never    Smokeless tobacco: Never   Vaping Use    Vaping status: Never Used   Substance Use Topics    Alcohol use: Never    Drug use: Never       Review of Systems   Constitutional:  Negative for chills and fever.   HENT:  Negative for ear pain, sinus pressure, sinus pain, sneezing and sore  throat.    Eyes:  Negative for pain and visual disturbance.   Respiratory:  Negative for cough and shortness of breath.    Cardiovascular:  Negative for chest pain and palpitations.   Gastrointestinal:  Positive for abdominal pain. Negative for vomiting.   Endocrine: Negative for polydipsia, polyphagia and polyuria.   Genitourinary:  Positive for pelvic pain. Negative for difficulty urinating, dysuria, frequency, hematuria, vaginal bleeding, vaginal discharge and vaginal pain.   Musculoskeletal:  Negative for arthralgias and back pain.   Skin:  Negative for color change and rash.   Neurological:  Negative for seizures and syncope.   All other systems reviewed and are negative.      Physical Exam  Physical Exam  Vitals and nursing note reviewed.   Constitutional:       General: She is not in acute distress.     Appearance: She is well-developed. She is not ill-appearing, toxic-appearing or diaphoretic.   HENT:      Head: Normocephalic and atraumatic.   Eyes:      General: No scleral icterus.     Extraocular Movements: Extraocular movements intact.      Conjunctiva/sclera: Conjunctivae normal.   Cardiovascular:      Rate and Rhythm: Normal rate and regular rhythm.      Heart sounds: No murmur heard.     No friction rub. No gallop.   Pulmonary:      Effort: Pulmonary effort is normal. No respiratory distress.      Breath sounds: Normal breath sounds. No wheezing, rhonchi or rales.   Chest:      Chest wall: No tenderness.   Abdominal:      General: Abdomen is flat. Bowel sounds are normal. There is no distension. There are no signs of injury.      Palpations: Abdomen is soft. There is no shifting dullness, fluid wave, hepatomegaly, splenomegaly, mass or pulsatile mass.      Tenderness: There is abdominal tenderness in the suprapubic area. There is no right CVA tenderness, left CVA tenderness, guarding or rebound. Negative signs include Hopson's sign, Rovsing's sign, McBurney's sign, psoas sign and obturator sign.       Hernia: No hernia is present.   Musculoskeletal:         General: No swelling.      Cervical back: Neck supple.   Skin:     General: Skin is warm and dry.      Capillary Refill: Capillary refill takes less than 2 seconds.   Neurological:      Mental Status: She is alert and oriented to person, place, and time.   Psychiatric:         Mood and Affect: Mood normal.         Behavior: Behavior normal.         Vital Signs  ED Triage Vitals   Temperature Pulse Respirations Blood Pressure SpO2   07/23/24 0832 07/23/24 0832 07/23/24 0832 07/23/24 0832 07/23/24 0832   98.2 °F (36.8 °C) 103 14 131/76 98 %      Temp Source Heart Rate Source Patient Position - Orthostatic VS BP Location FiO2 (%)   07/23/24 0832 07/23/24 0832 -- -- --   Oral Monitor         Pain Score       07/23/24 1126       9           Vitals:    07/23/24 0832   BP: 131/76   Pulse: 103         Visual Acuity      ED Medications  Medications   sodium chloride 0.9 % bolus 1,000 mL (0 mL Intravenous Stopped 7/23/24 1122)   ketorolac (TORADOL) injection 15 mg (15 mg Intravenous Given 7/23/24 1126)       Diagnostic Studies  Results Reviewed       Procedure Component Value Units Date/Time    Comprehensive metabolic panel [[id number]] Collected: 07/23/24 0857    Lab Status: Final result Specimen: Blood from Arm, Right Updated: 07/23/24 0923     Sodium 136 mmol/L      Potassium 4.0 mmol/L      Chloride 101 mmol/L      CO2 26 mmol/L      ANION GAP 9 mmol/L      BUN 10 mg/dL      Creatinine 0.63 mg/dL      Glucose 90 mg/dL      Calcium 9.4 mg/dL      AST 16 U/L      ALT 10 U/L      Alkaline Phosphatase 39 U/L      Total Protein 8.0 g/dL      Albumin 4.6 g/dL      Total Bilirubin 0.43 mg/dL      eGFR 118 ml/min/1.73sq m     Narrative:      National Kidney Disease Foundation guidelines for Chronic Kidney Disease (CKD):     Stage 1 with normal or high GFR (GFR > 90 mL/min/1.73 square meters)    Stage 2 Mild CKD (GFR = 60-89 mL/min/1.73 square meters)    Stage 3A Moderate CKD  (GFR = 45-59 mL/min/1.73 square meters)    Stage 3B Moderate CKD (GFR = 30-44 mL/min/1.73 square meters)    Stage 4 Severe CKD (GFR = 15-29 mL/min/1.73 square meters)    Stage 5 End Stage CKD (GFR <15 mL/min/1.73 square meters)  Note: GFR calculation is accurate only with a steady state creatinine    UA w Reflex to Microscopic w Reflex to Culture [[id number]]  (Normal) Collected: 07/23/24 0859    Lab Status: Final result Specimen: Urine, Clean Catch Updated: 07/23/24 0908     Color, UA Yellow     Clarity, UA Clear     Specific Gravity, UA 1.010     pH, UA 6.5     Leukocytes, UA Negative     Nitrite, UA Negative     Protein, UA Negative mg/dl      Glucose, UA Negative mg/dl      Ketones, UA Negative mg/dl      Urobilinogen, UA 0.2 E.U./dl      Bilirubin, UA Negative     Occult Blood, UA Negative    POCT pregnancy, urine [[id number]]  (Normal) Resulted: 07/23/24 0906    Lab Status: Final result Updated: 07/23/24 0906     EXT Preg Test, Ur Negative     Control Valid    CBC and differential [[id number]]  (Abnormal) Collected: 07/23/24 0857    Lab Status: Final result Specimen: Blood from Arm, Right Updated: 07/23/24 0904     WBC 11.69 Thousand/uL      RBC 4.93 Million/uL      Hemoglobin 14.5 g/dL      Hematocrit 44.4 %      MCV 90 fL      MCH 29.4 pg      MCHC 32.7 g/dL      RDW 12.0 %      MPV 9.6 fL      Platelets 255 Thousands/uL      nRBC 0 /100 WBCs      Segmented % 84 %      Immature Grans % 0 %      Lymphocytes % 11 %      Monocytes % 5 %      Eosinophils Relative 0 %      Basophils Relative 0 %      Absolute Neutrophils 9.76 Thousands/µL      Absolute Immature Grans 0.03 Thousand/uL      Absolute Lymphocytes 1.27 Thousands/µL      Absolute Monocytes 0.60 Thousand/µL      Eosinophils Absolute 0.01 Thousand/µL      Basophils Absolute 0.02 Thousands/µL                    US pelvis complete w transvaginal   Final Result by Oracio Magana MD (07/23 1039)      Normal exam.                           Workstation  performed: [id number]                    Procedures  Procedures         ED Course                                 SBIRT 22yo+      Flowsheet Row Most Recent Value   Initial Alcohol Screen: US AUDIT-C     1. How often do you have a drink containing alcohol? 0 Filed at: 07/23/2024 0834   2. How many drinks containing alcohol do you have on a typical day you are drinking?  0 Filed at: 07/23/2024 0834   3a. Male UNDER 65: How often do you have five or more drinks on one occasion? 0 Filed at: 07/23/2024 0834   3b. FEMALE Any Age, or MALE 65+: How often do you have 4 or more drinks on one occassion? 0 Filed at: 07/23/2024 0834   Audit-C Score 0 Filed at: 07/23/2024 0834   TIM: How many times in the past year have you...    Used an illegal drug or used a prescription medication for non-medical reasons? Never Filed at: 07/23/2024 0834                      Medical Decision Making  This is a 34 years old came for having abdominal pain for 2 days.  Patient denies any fever, vomiting, diarrhea.  Last BM was yesterday morning.  Patient denies any urinary symptoms.  Patient denies vaginal bleeding or discharge.   last BM was yesterday morning.  Patient denies any urinary symptoms.  Patient denies vaginal bleeding or discharge.  LMP 7/11/2024.  Patient has regular menstruation.  Patient has no previous abdominal surgery.  Last meal was last night.  Patient takes no medication.  Physical exam is pertinent for having suprapubic tenderness.  Reviewing the labs including CBC, CMP came back within normal limits.  UA is negative for infection.  Pregnancy test is negative.  US pelvis, TV; normal exam no acute abnormal findings.  Patient received Toradol injection and felt much better at this time again discharge patient home and follow-up with [facility].  All question concerns of patient have been fully addressed.  Differential diagnosis include but not limited to; ovarian cyst, ovarian torsion, cystitis, endometritis.    Amount and/or  Complexity of Data Reviewed  Labs: ordered.     Details: Reviewing the labs including CBC, CMP came back within normal limits.  UA is negative for infection.  Pregnancy test is negative.    Radiology: ordered.     Details: US pelvis, TV; no acute abnormal findings normal US.    Risk  Prescription drug management.                 Disposition  Final diagnoses:   Pelvic pain     Time reflects when diagnosis was documented in both MDM as applicable and the Disposition within this note       Time User Action Codes Description Comment    7/23/2024 11:44 AM Kim Tapia Add [R10.2] Pelvic pain           ED Disposition       ED Disposition   Discharge    Condition   Stable    Date/Time   Tue Jul 23, 2024 1143    Comment   Kimberly Haney discharge to home/self care.                   Follow-up Information       Follow up With Specialties Details Why Contact Info    [facility] In 1 week  [address] [address]  [phone]              Discharge Medication List as of 7/23/2024 11:45 AM        CONTINUE these medications which have NOT CHANGED    Details   albuterol (ProAir HFA) 90 mcg/act inhaler Inhale 2 puffs every 4 (four) hours as needed for wheezing, Starting Wed 1/10/2024, Normal      dextromethorphan-guaiFENesin (ROBITUSSIN DM)  mg/5 mL syrup Take 5 mL by mouth 3 (three) times a day as needed for cough, Starting Wed 1/10/2024, Normal      ibuprofen (MOTRIN) 600 mg tablet Take 1 tablet (600 mg total) by mouth every 6 (six) hours as needed for mild pain or moderate pain, Starting Mon 10/16/2023, Normal             No discharge procedures on file.    PDMP Review       None            ED Provider  Electronically Signed by             Kim Tapia MD  07/24/24 1962

## 2024-07-23 NOTE — Clinical Note
Kimberly Haney was seen and treated in our emergency department on 7/23/2024.                Diagnosis:     Kimberly  may return to work on return date.    She may return on this date: 07/24/2024         If you have any questions or concerns, please don't hesitate to call.      Doron Valle RN    ______________________________           _______________          _______________  Hospital Representative                              Date                                Time

## 2024-07-23 NOTE — DISCHARGE INSTRUCTIONS
Follow-up at the [place] [facility].  Take Motrin or Tylenol for pain as needed.  Labs Reviewed   CBC AND DIFFERENTIAL - Abnormal       Result Value Ref Range Status    WBC 11.69 (*) 4.31 - 10.16 Thousand/uL Final    RBC 4.93  3.81 - 5.12 Million/uL Final    Hemoglobin 14.5  11.5 - 15.4 g/dL Final    Hematocrit 44.4  34.8 - 46.1 % Final    MCV 90  82 - 98 fL Final    MCH 29.4  26.8 - 34.3 pg Final    MCHC 32.7  31.4 - 37.4 g/dL Final    RDW 12.0  11.6 - 15.1 % Final    MPV 9.6  8.9 - 12.7 fL Final    Platelets 255  149 - 390 Thousands/uL Final    nRBC 0  /100 WBCs Final    Segmented % 84 (*) 43 - 75 % Final    Immature Grans % 0  0 - 2 % Final    Lymphocytes % 11 (*) 14 - 44 % Final    Monocytes % 5  4 - 12 % Final    Eosinophils Relative 0  0 - 6 % Final    Basophils Relative 0  0 - 1 % Final    Absolute Neutrophils 9.76 (*) 1.85 - 7.62 Thousands/µL Final    Absolute Immature Grans 0.03  0.00 - 0.20 Thousand/uL Final    Absolute Lymphocytes 1.27  0.60 - 4.47 Thousands/µL Final    Absolute Monocytes 0.60  0.17 - 1.22 Thousand/µL Final    Eosinophils Absolute 0.01  0.00 - 0.61 Thousand/µL Final    Basophils Absolute 0.02  0.00 - 0.10 Thousands/µL Final   UA W REFLEX TO MICROSCOPIC WITH REFLEX TO CULTURE - Normal    Color, UA Yellow  Yellow Final    Clarity, UA Clear  Clear Final    Specific Gravity, UA 1.010  1.001 - 1.030 Final    pH, UA 6.5  5.0, 5.5, 6.0, 6.5, 7.0, 7.5, 8.0 Final    Leukocytes, UA Negative  Negative Final    Nitrite, UA Negative  Negative Final    Protein, UA Negative  Negative, Interference- unable to analyze mg/dl Final    Glucose, UA Negative  Negative mg/dl Final    Ketones, UA Negative  Negative mg/dl Final    Urobilinogen, UA 0.2  0.2, 1.0 E.U./dl E.U./dl Final    Bilirubin, UA Negative  Negative Final    Occult Blood, UA Negative  Negative Final   POCT PREGNANCY, URINE - Normal    EXT Preg Test, Ur Negative   Final    Control Valid   Final   COMPREHENSIVE METABOLIC PANEL    Sodium 136   135 - 147 mmol/L Final    Potassium 4.0  3.5 - 5.3 mmol/L Final    Chloride 101  96 - 108 mmol/L Final    CO2 26  21 - 32 mmol/L Final    ANION GAP 9  4 - 13 mmol/L Final    BUN 10  5 - 25 mg/dL Final    Creatinine 0.63  0.60 - 1.30 mg/dL Final    Comment: Standardized to IDMS reference method    Glucose 90  65 - 140 mg/dL Final    Comment: If the patient is fasting, the ADA then defines impaired fasting glucose as > 100 mg/dL and diabetes as > or equal to 123 mg/dL.    Calcium 9.4  8.4 - 10.2 mg/dL Final    AST 16  13 - 39 U/L Final    ALT 10  7 - 52 U/L Final    Comment: Specimen collection should occur prior to Sulfasalazine administration due to the potential for falsely depressed results.     Alkaline Phosphatase 39  34 - 104 U/L Final    Total Protein 8.0  6.4 - 8.4 g/dL Final    Albumin 4.6  3.5 - 5.0 g/dL Final    Total Bilirubin 0.43  0.20 - 1.00 mg/dL Final    Comment: Use of this assay is not recommended for patients undergoing treatment with eltrombopag due to the potential for falsely elevated results.  N-acetyl-p-benzoquinone imine (metabolite of Acetaminophen) will generate erroneously low results in samples for patients that have taken an overdose of Acetaminophen.    eGFR 118  ml/min/1.73sq m Final    Narrative:     National Kidney Disease Foundation guidelines for Chronic Kidney Disease (CKD):     Stage 1 with normal or high GFR (GFR > 90 mL/min/1.73 square meters)    Stage 2 Mild CKD (GFR = 60-89 mL/min/1.73 square meters)    Stage 3A Moderate CKD (GFR = 45-59 mL/min/1.73 square meters)    Stage 3B Moderate CKD (GFR = 30-44 mL/min/1.73 square meters)    Stage 4 Severe CKD (GFR = 15-29 mL/min/1.73 square meters)    Stage 5 End Stage CKD (GFR <15 mL/min/1.73 square meters)  Note: GFR calculation is accurate only with a steady state creatinine     US pelvis complete w transvaginal   Final Result      Normal exam.                           Workstation performed: [id number]

## 2024-07-25 ENCOUNTER — HOSPITAL ENCOUNTER (EMERGENCY)
Facility: HOSPITAL | Age: 34
Discharge: HOME | End: 2024-07-25
Attending: EMERGENCY MEDICINE
Payer: COMMERCIAL

## 2024-07-25 ENCOUNTER — APPOINTMENT (EMERGENCY)
Dept: CT IMAGING | Facility: HOSPITAL | Age: 34
End: 2024-07-25
Payer: COMMERCIAL

## 2024-07-25 VITALS
SYSTOLIC BLOOD PRESSURE: 124 MMHG | RESPIRATION RATE: 16 BRPM | HEART RATE: 88 BPM | DIASTOLIC BLOOD PRESSURE: 59 MMHG | WEIGHT: 138.89 LBS | TEMPERATURE: 98.3 F | OXYGEN SATURATION: 98 %

## 2024-07-25 DIAGNOSIS — K59.00 CONSTIPATION: Primary | ICD-10-CM

## 2024-07-25 DIAGNOSIS — R10.84 GENERALIZED ABDOMINAL PAIN: ICD-10-CM

## 2024-07-25 DIAGNOSIS — K52.9 ENTERITIS: ICD-10-CM

## 2024-07-25 LAB
ALBUMIN SERPL BCG-MCNC: 4.5 G/DL (ref 3.5–5)
ALP SERPL-CCNC: 36 U/L (ref 34–104)
ALT SERPL W P-5'-P-CCNC: 11 U/L (ref 7–52)
ANION GAP SERPL CALCULATED.3IONS-SCNC: 8 MMOL/L (ref 4–13)
AST SERPL W P-5'-P-CCNC: 16 U/L (ref 13–39)
BASOPHILS # BLD AUTO: 0.01 THOUSANDS/ÂΜL (ref 0–0.1)
BASOPHILS NFR BLD AUTO: 0 % (ref 0–1)
BILIRUB SERPL-MCNC: 0.32 MG/DL (ref 0.2–1)
BILIRUB UR QL STRIP: NEGATIVE
BUN SERPL-MCNC: 13 MG/DL (ref 5–25)
CALCIUM SERPL-MCNC: 9.5 MG/DL (ref 8.4–10.2)
CHLORIDE SERPL-SCNC: 102 MMOL/L (ref 96–108)
CLARITY UR: CLEAR
CO2 SERPL-SCNC: 26 MMOL/L (ref 21–32)
COLOR UR: YELLOW
CREAT SERPL-MCNC: 0.78 MG/DL (ref 0.6–1.3)
EOSINOPHIL # BLD AUTO: 0.08 THOUSAND/ÂΜL (ref 0–0.61)
EOSINOPHIL NFR BLD AUTO: 1 % (ref 0–6)
ERYTHROCYTE [DISTWIDTH] IN BLOOD BY AUTOMATED COUNT: 11.9 % (ref 11.6–15.1)
EXT PREGNANCY TEST URINE: NEGATIVE
EXT. CONTROL: NORMAL
GFR SERPL CREATININE-BSD FRML MDRD: 100 ML/MIN/1.73SQ M
GLUCOSE SERPL-MCNC: 103 MG/DL (ref 65–140)
GLUCOSE UR STRIP-MCNC: NEGATIVE MG/DL
HCT VFR BLD AUTO: 40.3 % (ref 34.8–46.1)
HGB BLD-MCNC: 13.4 G/DL (ref 11.5–15.4)
HGB UR QL STRIP.AUTO: NEGATIVE
IMM GRANULOCYTES # BLD AUTO: 0.02 THOUSAND/UL (ref 0–0.2)
IMM GRANULOCYTES NFR BLD AUTO: 0 % (ref 0–2)
KETONES UR STRIP-MCNC: ABNORMAL MG/DL
LEUKOCYTE ESTERASE UR QL STRIP: NEGATIVE
LIPASE SERPL-CCNC: 17 U/L (ref 11–82)
LYMPHOCYTES # BLD AUTO: 2 THOUSANDS/ÂΜL (ref 0.6–4.47)
LYMPHOCYTES NFR BLD AUTO: 23 % (ref 14–44)
MCH RBC QN AUTO: 29.8 PG (ref 26.8–34.3)
MCHC RBC AUTO-ENTMCNC: 33.3 G/DL (ref 31.4–37.4)
MCV RBC AUTO: 90 FL (ref 82–98)
MONOCYTES # BLD AUTO: 0.64 THOUSAND/ÂΜL (ref 0.17–1.22)
MONOCYTES NFR BLD AUTO: 7 % (ref 4–12)
NEUTROPHILS # BLD AUTO: 5.94 THOUSANDS/ÂΜL (ref 1.85–7.62)
NEUTS SEG NFR BLD AUTO: 69 % (ref 43–75)
NITRITE UR QL STRIP: NEGATIVE
NRBC BLD AUTO-RTO: 0 /100 WBCS
PH UR STRIP.AUTO: 6 [PH]
PLATELET # BLD AUTO: 264 THOUSANDS/UL (ref 149–390)
PMV BLD AUTO: 9.8 FL (ref 8.9–12.7)
POTASSIUM SERPL-SCNC: 4.3 MMOL/L (ref 3.5–5.3)
PROT SERPL-MCNC: 8 G/DL (ref 6.4–8.4)
PROT UR STRIP-MCNC: NEGATIVE MG/DL
RBC # BLD AUTO: 4.49 MILLION/UL (ref 3.81–5.12)
SODIUM SERPL-SCNC: 136 MMOL/L (ref 135–147)
SP GR UR STRIP.AUTO: >=1.03 (ref 1–1.03)
UROBILINOGEN UR QL STRIP.AUTO: 0.2 E.U./DL
WBC # BLD AUTO: 8.69 THOUSAND/UL (ref 4.31–10.16)

## 2024-07-25 PROCEDURE — 99284 EMERGENCY DEPT VISIT MOD MDM: CPT

## 2024-07-25 PROCEDURE — 96374 THER/PROPH/DIAG INJ IV PUSH: CPT

## 2024-07-25 PROCEDURE — 83690 ASSAY OF LIPASE: CPT | Performed by: EMERGENCY MEDICINE

## 2024-07-25 PROCEDURE — 81025 URINE PREGNANCY TEST: CPT | Performed by: EMERGENCY MEDICINE

## 2024-07-25 PROCEDURE — 80053 COMPREHEN METABOLIC PANEL: CPT | Performed by: EMERGENCY MEDICINE

## 2024-07-25 PROCEDURE — 99285 EMERGENCY DEPT VISIT HI MDM: CPT | Performed by: EMERGENCY MEDICINE

## 2024-07-25 PROCEDURE — 36415 COLL VENOUS BLD VENIPUNCTURE: CPT | Performed by: EMERGENCY MEDICINE

## 2024-07-25 PROCEDURE — 85025 COMPLETE CBC W/AUTO DIFF WBC: CPT | Performed by: EMERGENCY MEDICINE

## 2024-07-25 PROCEDURE — 74177 CT ABD & PELVIS W/CONTRAST: CPT

## 2024-07-25 PROCEDURE — 81003 URINALYSIS AUTO W/O SCOPE: CPT | Performed by: EMERGENCY MEDICINE

## 2024-07-25 RX ORDER — POLYETHYLENE GLYCOL 3350 17 G/17G
17 POWDER, FOR SOLUTION ORAL DAILY
Qty: 10 EACH | Refills: 0 | Status: SHIPPED | OUTPATIENT
Start: 2024-07-25

## 2024-07-25 RX ORDER — KETOROLAC TROMETHAMINE 30 MG/ML
15 INJECTION, SOLUTION INTRAMUSCULAR; INTRAVENOUS ONCE
Status: COMPLETED | OUTPATIENT
Start: 2024-07-25 | End: 2024-07-25

## 2024-07-25 RX ORDER — BISACODYL 5 MG/1
5 TABLET, DELAYED RELEASE ORAL DAILY PRN
Qty: 14 TABLET | Refills: 0 | Status: SHIPPED | OUTPATIENT
Start: 2024-07-25

## 2024-07-25 RX ADMIN — KETOROLAC TROMETHAMINE 15 MG: 30 INJECTION, SOLUTION INTRAMUSCULAR at 21:27

## 2024-07-25 RX ADMIN — IOHEXOL 100 ML: 350 INJECTION, SOLUTION INTRAVENOUS at 22:19

## 2024-07-25 NOTE — Clinical Note
Kimberly Haney was seen and treated in our emergency department on 7/25/2024.    No restrictions            Diagnosis: Abdominal pain    Kimberly  may return to work on return date.    She may return on this date: 07/27/2024         If you have any questions or concerns, please don't hesitate to call.      Abdoul Marquez MD    ______________________________           _______________          _______________  Hospital Representative                              Date                                Time

## 2024-07-26 NOTE — ED PROVIDER NOTES
History  Chief Complaint   Patient presents with    Abdominal Pain     Pt reports having abdomen pain that radiates to the legs. Pt reports feeling constipated. Last BM was today. No meds pta.      34-year-old female presents to the emergency department for evaluation of abdominal pain.  Patient reports continued abdominal pain over the past several days.  States that she was seen here in the emergency department 2 days ago for evaluation of similar symptoms.  Per chart review patient had an extensive workup including blood work, urinalysis and pelvic ultrasound.  She was treated symptomatically and ultimately discharged.  Patient states that she has also felt constipated and believes her symptoms could be related to constipation.  States that she drank milk of magnesia yesterday and did have a bowel movement.  States that she did not take any medications today to treat her symptoms prior to arrival.  She describes the abdominal pain as a fullness with radiation to her legs.  She denies fevers, chills, nausea, vomiting, diarrhea, sick contacts, urinary symptoms and localized numbness, tingling or weakness.        Prior to Admission Medications   Prescriptions Last Dose Informant Patient Reported? Taking?   albuterol (ProAir HFA) 90 mcg/act inhaler   No No   Sig: Inhale 2 puffs every 4 (four) hours as needed for wheezing   dextromethorphan-guaiFENesin (ROBITUSSIN DM)  mg/5 mL syrup   No No   Sig: Take 5 mL by mouth 3 (three) times a day as needed for cough   ibuprofen (MOTRIN) 600 mg tablet   No No   Sig: Take 1 tablet (600 mg total) by mouth every 6 (six) hours as needed for mild pain or moderate pain      Facility-Administered Medications: None       Past Medical History:   Diagnosis Date    Asthma        History reviewed. No pertinent surgical history.    History reviewed. No pertinent family history.  I have reviewed and agree with the history as documented.    E-Cigarette/Vaping    E-Cigarette Use Never  User      E-Cigarette/Vaping Substances     Social History     Tobacco Use    Smoking status: Never    Smokeless tobacco: Never   Vaping Use    Vaping status: Never Used   Substance Use Topics    Alcohol use: Never    Drug use: Never       Review of Systems   Constitutional:  Negative for chills and fever.   HENT:  Negative for ear pain and sore throat.    Eyes:  Negative for pain and visual disturbance.   Respiratory:  Negative for cough and shortness of breath.    Cardiovascular:  Negative for chest pain and palpitations.   Gastrointestinal:  Positive for abdominal pain and constipation. Negative for vomiting.   Genitourinary:  Negative for dysuria and hematuria.   Musculoskeletal:  Negative for arthralgias and back pain.   Skin:  Negative for color change and rash.   Neurological:  Negative for seizures and syncope.   All other systems reviewed and are negative.      Physical Exam  Physical Exam  Vitals and nursing note reviewed.   Constitutional:       General: She is not in acute distress.     Appearance: She is well-developed.   HENT:      Head: Normocephalic and atraumatic.   Eyes:      Conjunctiva/sclera: Conjunctivae normal.   Cardiovascular:      Rate and Rhythm: Normal rate and regular rhythm.      Heart sounds: No murmur heard.  Pulmonary:      Effort: Pulmonary effort is normal. No respiratory distress.      Breath sounds: Normal breath sounds.   Abdominal:      Palpations: Abdomen is soft.      Tenderness: There is generalized abdominal tenderness. There is no guarding or rebound.   Musculoskeletal:         General: No swelling or tenderness.      Cervical back: Neck supple.      Right hip: Normal.      Left hip: Normal.      Right upper leg: Normal.      Left upper leg: Normal.   Skin:     General: Skin is warm and dry.      Capillary Refill: Capillary refill takes less than 2 seconds.   Neurological:      Mental Status: She is alert.   Psychiatric:         Mood and Affect: Mood normal.         Vital  Signs  ED Triage Vitals   Temperature Pulse Respirations Blood Pressure SpO2   07/25/24 2109 07/25/24 2109 07/25/24 2109 07/25/24 2109 07/25/24 2109   98.3 °F (36.8 °C) 88 18 148/70 98 %      Temp Source Heart Rate Source Patient Position - Orthostatic VS BP Location FiO2 (%)   07/25/24 2109 07/25/24 2109 07/25/24 2109 07/25/24 2109 --   Oral Monitor Lying Left arm       Pain Score       07/25/24 2127       8           Vitals:    07/25/24 2109 07/25/24 2319   BP: 148/70 124/59   Pulse: 88 88   Patient Position - Orthostatic VS: Lying Lying         Visual Acuity      ED Medications  Medications   ketorolac (TORADOL) injection 15 mg (15 mg Intravenous Given 7/25/24 2127)   iohexol (OMNIPAQUE) 350 MG/ML injection (SINGLE-DOSE) 100 mL (100 mL Intravenous Given 7/25/24 2219)       Diagnostic Studies  Results Reviewed       Procedure Component Value Units Date/Time    Comprehensive metabolic panel [[id number]] Collected: 07/25/24 2124    Lab Status: Final result Specimen: Blood from Arm, Left Updated: 07/25/24 2156     Sodium 136 mmol/L      Potassium 4.3 mmol/L      Chloride 102 mmol/L      CO2 26 mmol/L      ANION GAP 8 mmol/L      BUN 13 mg/dL      Creatinine 0.78 mg/dL      Glucose 103 mg/dL      Calcium 9.5 mg/dL      AST 16 U/L      ALT 11 U/L      Alkaline Phosphatase 36 U/L      Total Protein 8.0 g/dL      Albumin 4.5 g/dL      Total Bilirubin 0.32 mg/dL      eGFR 100 ml/min/1.73sq m     Narrative:      National Kidney Disease Foundation guidelines for Chronic Kidney Disease (CKD):     Stage 1 with normal or high GFR (GFR > 90 mL/min/1.73 square meters)    Stage 2 Mild CKD (GFR = 60-89 mL/min/1.73 square meters)    Stage 3A Moderate CKD (GFR = 45-59 mL/min/1.73 square meters)    Stage 3B Moderate CKD (GFR = 30-44 mL/min/1.73 square meters)    Stage 4 Severe CKD (GFR = 15-29 mL/min/1.73 square meters)    Stage 5 End Stage CKD (GFR <15 mL/min/1.73 square meters)  Note: GFR calculation is accurate only with a steady  state creatinine    Lipase [[id number]]  (Normal) Collected: 07/25/24 2124    Lab Status: Final result Specimen: Blood from Arm, Left Updated: 07/25/24 2156     Lipase 17 u/L     UA (URINE) with reflex to Scope [[id number]]  (Abnormal) Collected: 07/25/24 2124    Lab Status: Final result Specimen: Urine, Clean Catch Updated: 07/25/24 2145     Color, UA Yellow     Clarity, UA Clear     Specific Gravity, UA >=1.030     pH, UA 6.0     Leukocytes, UA Negative     Nitrite, UA Negative     Protein, UA Negative mg/dl      Glucose, UA Negative mg/dl      Ketones, UA Trace mg/dl      Urobilinogen, UA 0.2 E.U./dl      Bilirubin, UA Negative     Occult Blood, UA Negative    POCT pregnancy, urine [[id number]]  (Normal) Resulted: 07/25/24 2125    Lab Status: Final result Updated: 07/25/24 2132     EXT Preg Test, Ur Negative     Control Valid    CBC and differential [[id number]] Collected: 07/25/24 2124    Lab Status: Final result Specimen: Blood from Arm, Left Updated: 07/25/24 2130     WBC 8.69 Thousand/uL      RBC 4.49 Million/uL      Hemoglobin 13.4 g/dL      Hematocrit 40.3 %      MCV 90 fL      MCH 29.8 pg      MCHC 33.3 g/dL      RDW 11.9 %      MPV 9.8 fL      Platelets 264 Thousands/uL      nRBC 0 /100 WBCs      Segmented % 69 %      Immature Grans % 0 %      Lymphocytes % 23 %      Monocytes % 7 %      Eosinophils Relative 1 %      Basophils Relative 0 %      Absolute Neutrophils 5.94 Thousands/µL      Absolute Immature Grans 0.02 Thousand/uL      Absolute Lymphocytes 2.00 Thousands/µL      Absolute Monocytes 0.64 Thousand/µL      Eosinophils Absolute 0.08 Thousand/µL      Basophils Absolute 0.01 Thousands/µL                    CT abdomen pelvis with contrast   Final Result by Mychal Watson DO (07/25 2258)      CT findings are nonspecific but may suggest low-grade enteritis.      Moderate colonic fecal retention which can be seen in the setting of constipation            Workstation performed: [id number]                     Procedures  Procedures         ED Course  ED Course as of 07/25/24 2331   u Jul 25, 2024 2137 WBC: 8.69   2137 PREGNANCY TEST URINE: Negative                                               Medical Decision Making  34-year-old female presented to the emergency department for evaluation of abdominal pain.  On arrival the patient was awake, alert, oriented and in no acute distress.  Initial vital signs unremarkable.  On exam patient with generalized tenderness to palpation.  CT scan was ordered to evaluate for acute pathology including but not limited to obstruction, perforation, infection, abscess.  Blood work was grossly unremarkable.  Urinalysis was not consistent with a urinary tract infection.  CT scan consistent with constipation/enteritis.  On reevaluation patient reported improvement of symptoms.  All diagnostic studies were discussed with the patient in detail.  She is appropriate for discharge.  Recommendation was made for the patient to follow-up with her PCP for continued symptoms.  A prescription for MiraLAX and Dulcolax was sent to the patient's pharmacy.  Return precautions were discussed.    Patient agrees with the plan for discharge and feels comfortable to go home with proper f/u. Advised to return for worsening or additional problems. Diagnostic tests were reviewed and questions answered. Diagnosis, care plan and treatment options were discussed. The patient understands instructions and will follow up as directed.        Amount and/or Complexity of Data Reviewed  Labs: ordered. Decision-making details documented in ED Course.  Radiology: ordered.    Risk  OTC drugs.  Prescription drug management.                 Disposition  Final diagnoses:   Constipation   Enteritis   Generalized abdominal pain     Time reflects when diagnosis was documented in both MDM as applicable and the Disposition within this note       Time User Action Codes Description Comment    7/25/2024 11:02 PM Jimmy  Abdoul Berrios [K59.00] Constipation     7/25/2024 11:02 PM Abdoul Marquez [K52.9] Enteritis     7/25/2024 11:13 PM Abdoul Marquez [R10.84] Generalized abdominal pain           ED Disposition       ED Disposition   Discharge    Condition   Stable    Date/Time   Thu Jul 25, 2024 11:02 PM    Comment   Kimberly Haney discharge to home/self care.                   Follow-up Information       Follow up With Specialties Details Why Contact Info Additional Information    [facility] Schedule an appointment as soon as possible for a visit   [address] [facility] [address] [phone]       [facility] Emergency Department Emergency Medicine Go to  If symptoms worsen [address] [place] [id number] [phone] [facility] Emergency Department, [address] [address]            Discharge Medication List as of 7/25/2024 11:14 PM        START taking these medications    Details   bisacodyl (DULCOLAX) 5 mg EC tablet Take 1 tablet (5 mg total) by mouth daily as needed for constipation, Starting Thu 7/25/2024, Normal      polyethylene glycol (MIRALAX) 17 g packet Take 17 g by mouth daily, Starting Thu 7/25/2024, Normal           CONTINUE these medications which have NOT CHANGED    Details   albuterol (ProAir HFA) 90 mcg/act inhaler Inhale 2 puffs every 4 (four) hours as needed for wheezing, Starting Wed 1/10/2024, Normal      dextromethorphan-guaiFENesin (ROBITUSSIN DM)  mg/5 mL syrup Take 5 mL by mouth 3 (three) times a day as needed for cough, Starting Wed 1/10/2024, Normal      ibuprofen (MOTRIN) 600 mg tablet Take 1 tablet (600 mg total) by mouth every 6 (six) hours as needed for mild pain or moderate pain, Starting Mon 10/16/2023, Normal             No discharge procedures on file.    PDMP Review       None            ED Provider  Electronically Signed by             Abdoul Marquez,  MD  07/25/24 2244

## 2025-06-09 ENCOUNTER — HOSPITAL ENCOUNTER (EMERGENCY)
Facility: HOSPITAL | Age: 35
Discharge: HOME | End: 2025-06-09
Attending: EMERGENCY MEDICINE | Admitting: EMERGENCY MEDICINE
Payer: COMMERCIAL

## 2025-06-09 VITALS
RESPIRATION RATE: 17 BRPM | HEART RATE: 85 BPM | OXYGEN SATURATION: 99 % | SYSTOLIC BLOOD PRESSURE: 127 MMHG | TEMPERATURE: 97.9 F | DIASTOLIC BLOOD PRESSURE: 60 MMHG

## 2025-06-09 DIAGNOSIS — J06.9 VIRAL URI WITH COUGH: Primary | ICD-10-CM

## 2025-06-09 LAB
FLUAV AG UPPER RESP QL IA.RAPID: NEGATIVE
FLUBV AG UPPER RESP QL IA.RAPID: NEGATIVE
S PYO DNA THROAT QL NAA+PROBE: NOT DETECTED
SARS-COV+SARS-COV-2 AG RESP QL IA.RAPID: NEGATIVE

## 2025-06-09 PROCEDURE — 87651 STREP A DNA AMP PROBE: CPT

## 2025-06-09 PROCEDURE — 87811 SARS-COV-2 COVID19 W/OPTIC: CPT

## 2025-06-09 PROCEDURE — 87804 INFLUENZA ASSAY W/OPTIC: CPT

## 2025-06-09 PROCEDURE — 99284 EMERGENCY DEPT VISIT MOD MDM: CPT

## 2025-06-09 PROCEDURE — 99283 EMERGENCY DEPT VISIT LOW MDM: CPT

## 2025-06-09 RX ORDER — ACETAMINOPHEN 325 MG/1
650 TABLET ORAL ONCE
Status: COMPLETED | OUTPATIENT
Start: 2025-06-09 | End: 2025-06-09

## 2025-06-09 RX ADMIN — ACETAMINOPHEN 650 MG: 325 TABLET ORAL at 11:33

## 2025-06-09 NOTE — DISCHARGE INSTRUCTIONS
Please follow-up with your primary care provider for further evaluation and management for improvement in symptoms in the next few days.  Return to the ED if you develop any new or worsening symptoms.

## 2025-06-09 NOTE — ED PROVIDER NOTES
Time reflects when diagnosis was documented in both MDM as applicable and the Disposition within this note       Time User Action Codes Description Comment    6/9/2025 11:09 AM Олег Murphy Add [J06.9] Viral URI with cough           ED Disposition       ED Disposition   Discharge    Condition   Stable    Date/Time   Mon Jun 9, 2025 11:10 AM    Comment   Kimberly Haney discharge to home/self care.                   Assessment & Plan       Medical Decision Making  35-year-old female presents to the ED for evaluation of flulike symptoms ongoing approximately 3 days including intermittently productive cough, runny nose, diffuse bodyaches.  Does report both of her sons are sick at home with similar symptoms.  One of her sons did test positive for influenza B.  Patient afebrile in the ED with normal vital signs, overall well-appearing on exam, denies any difficulty tolerating p.o. intake at home.  Symptoms likely viral in origin, advised supportive care at home.  Patient advised follow-up with her PCP for further evaluation and management if no improvement in symptoms in the next few days with ED return precautions discussed.  Through [facility] , patient verbalized understanding and agreement with plan.    Amount and/or Complexity of Data Reviewed  Labs: ordered.    Risk  OTC drugs.             Medications   acetaminophen (TYLENOL) tablet 650 mg (650 mg Oral Given 6/9/25 1133)       ED Risk Strat Scores                    No data recorded                            History of Present Illness       Chief Complaint   Patient presents with    Generalized Body Aches     Generalized body aches, cough, HA for three days, son sick with same, tylenol PTA       Past Medical History[1]   Past Surgical History[2]   Family History[3]   Social History[4]   E-Cigarette/Vaping    E-Cigarette Use Never User       E-Cigarette/Vaping Substances      I have reviewed and agree with the history as documented.     Is a  35-year-old female who presents to the ED for evaluation of flulike symptoms X approximately 3 days.  Patient reports intermittently productive nonbloody cough, runny nose, sore throat, headache and generalized bodyaches.  Does report both of her sons have been sick at home with similar symptoms.  She denies any recent travel.  Denies any tolerating p.o. intake at home.  She denies any recent fevers, vision changes, neck pain, chest pain, SOB, abdominal pain, NVD, dysuria.  Denies any possibility of pregnancy.  States she last took Tylenol last night.       used: Yes ([facility])    Generalized Body Aches  Associated symptoms: headaches, rhinorrhea and sore throat    Associated symptoms: no abdominal pain, no chest pain, no cough, no diarrhea, no fever, no nausea, no shortness of breath and no vomiting        Review of Systems   Constitutional:  Negative for chills and fever.   HENT:  Positive for rhinorrhea and sore throat. Negative for trouble swallowing.    Eyes:  Negative for photophobia and visual disturbance.   Respiratory:  Negative for cough and shortness of breath.    Cardiovascular:  Negative for chest pain and palpitations.   Gastrointestinal:  Negative for abdominal pain, diarrhea, nausea and vomiting.   Genitourinary:  Negative for difficulty urinating, dysuria, flank pain and hematuria.   Musculoskeletal:  Positive for arthralgias (Reports diffuse body aches.). Negative for neck pain and neck stiffness.   Neurological:  Positive for headaches. Negative for dizziness, syncope and light-headedness.           Objective       ED Triage Vitals   Temperature Pulse Blood Pressure Respirations SpO2 Patient Position - Orthostatic VS   06/09/25 0945 06/09/25 0945 06/09/25 0945 06/09/25 0945 06/09/25 0945 --   97.9 °F (36.6 °C) 85 127/60 17 99 %       Temp src Heart Rate Source BP Location FiO2 (%) Pain Score    -- -- -- -- 06/09/25 1133        10 - Worst Possible Pain      Vitals      Date and  Time Temp Pulse SpO2 Resp BP Pain Score FACES Pain Rating User   06/09/25 1133 -- -- -- -- -- 10 - Worst Possible Pain -- GG   06/09/25 0945 97.9 °F (36.6 °C) 85 99 % 17 127/60 -- -- KD            Physical Exam  Vitals and nursing note reviewed.   Constitutional:       General: She is not in acute distress.     Appearance: She is well-developed. She is not toxic-appearing or diaphoretic.   HENT:      Head: Normocephalic and atraumatic.      Nose: Rhinorrhea present.      Mouth/Throat:      Pharynx: Oropharynx is clear.      Tonsils: No tonsillar exudate. 0 on the right. 0 on the left.     Eyes:      Extraocular Movements: Extraocular movements intact.      Conjunctiva/sclera: Conjunctivae normal.      Pupils: Pupils are equal, round, and reactive to light.       Cardiovascular:      Rate and Rhythm: Normal rate and regular rhythm.      Heart sounds: No murmur heard.  Pulmonary:      Effort: Pulmonary effort is normal. No respiratory distress.      Breath sounds: Normal breath sounds.   Abdominal:      Palpations: Abdomen is soft.      Tenderness: There is no abdominal tenderness. There is no right CVA tenderness or left CVA tenderness.     Musculoskeletal:         General: No swelling.      Cervical back: Normal range of motion and neck supple. No rigidity. Normal range of motion.     Skin:     General: Skin is warm and dry.      Capillary Refill: Capillary refill takes less than 2 seconds.     Neurological:      General: No focal deficit present.      Mental Status: She is alert and oriented to person, place, and time.      GCS: GCS eye subscore is 4. GCS verbal subscore is 5. GCS motor subscore is 6.      Gait: Gait is intact. Gait normal.     Psychiatric:         Mood and Affect: Mood normal.         Results Reviewed       Procedure Component Value Units Date/Time    Strep A PCR [[id number]]  (Normal) Collected: 06/09/25 1034    Lab Status: Final result Specimen: Throat Updated: 06/09/25 1119     STREP A PCR Not  Detected    FLU/COVID Rapid Antigen (30 min. TAT) - Preferred screening test in ED [[id number]]  (Normal) Collected: 06/09/25 1034    Lab Status: Final result Specimen: Nares from Nose Updated: 06/09/25 1108     SARS COV Rapid Antigen Negative     Influenza A Rapid Antigen Negative     Influenza B Rapid Antigen Negative    Narrative:      This test has been performed using the [employer] [facility] 2 FLU+SARS Antigen test under the Emergency Use Authorization (EUA). This test has been validated by the  and verified by the performing laboratory. The Maryuri uses lateral flow immunofluorescent sandwich assay to detect SARS-COV, Influenza A and Influenza B Antigen.     The [employer] Mary[employer] 2 SARS Antigen test does not differentiate between SARS-CoV and SARS-CoV-2.     Negative results are presumptive and may be confirmed with a molecular assay, if necessary, for patient management. Negative results do not rule out SARS-CoV-2 or influenza infection and should not be used as the sole basis for treatment or patient management decisions. A negative test result may occur if the level of antigen in a sample is below the limit of detection of this test.     Positive results are indicative of the presence of viral antigens, but do not rule out bacterial infection or co-infection with other viruses.     All test results should be used as an adjunct to clinical observations and other information available to the provider.    FOR PEDIATRIC PATIENTS - copy/paste COVID Guidelines URL to browser: [email or web address]            No orders to display       Procedures    ED Medication and Procedure Management   Prior to Admission Medications   Prescriptions Last Dose Informant Patient Reported? Taking?   albuterol (ProAir HFA) 90 mcg/act inhaler   No No   Sig: Inhale 2 puffs every 4 (four) hours as needed for wheezing   bisacodyl (DULCOLAX) 5 mg EC tablet   No No   Sig: Take 1 tablet (5  mg total) by mouth daily as needed for constipation   dextromethorphan-guaiFENesin (ROBITUSSIN DM)  mg/5 mL syrup   No No   Sig: Take 5 mL by mouth 3 (three) times a day as needed for cough   ibuprofen (MOTRIN) 600 mg tablet   No No   Sig: Take 1 tablet (600 mg total) by mouth every 6 (six) hours as needed for mild pain or moderate pain   polyethylene glycol (MIRALAX) 17 g packet   No No   Sig: Take 17 g by mouth daily      Facility-Administered Medications: None     Discharge Medication List as of 6/9/2025 11:14 AM        CONTINUE these medications which have NOT CHANGED    Details   albuterol (ProAir HFA) 90 mcg/act inhaler Inhale 2 puffs every 4 (four) hours as needed for wheezing, Starting Wed 1/10/2024, Normal      bisacodyl (DULCOLAX) 5 mg EC tablet Take 1 tablet (5 mg total) by mouth daily as needed for constipation, Starting Thu 7/25/2024, Normal      dextromethorphan-guaiFENesin (ROBITUSSIN DM)  mg/5 mL syrup Take 5 mL by mouth 3 (three) times a day as needed for cough, Starting Wed 1/10/2024, Normal      ibuprofen (MOTRIN) 600 mg tablet Take 1 tablet (600 mg total) by mouth every 6 (six) hours as needed for mild pain or moderate pain, Starting Mon 10/16/2023, Normal      polyethylene glycol (MIRALAX) 17 g packet Take 17 g by mouth daily, Starting Thu 7/25/2024, Normal           No discharge procedures on file.  ED SEPSIS DOCUMENTATION   Time reflects when diagnosis was documented in both MDM as applicable and the Disposition within this note       Time User Action Codes Description Comment    6/9/2025 11:09 Олег Zepeda Add [J06.9] Viral URI with cough                    Олег Murphy PA-C  06/09/25 1424         [1]   Past Medical History:  Diagnosis Date    Asthma    [2] No past surgical history on file.  [3] No family history on file.  [4]   Social History  Tobacco Use    Smoking status: Never    Smokeless tobacco: Never   Vaping Use    Vaping status: Never Used   Substance Use Topics     Alcohol use: Never    Drug use: Never        Олег Murphy PA-C  06/09/25 142

## 2025-06-09 NOTE — Clinical Note
Kimberly Haney was seen and treated in our emergency department on 6/9/2025.                Diagnosis:     Kimberly  .    She may return on this date: 06/11/2025         If you have any questions or concerns, please don't hesitate to call.      Олег Murphy PA-C    ______________________________           _______________          _______________  Hospital Representative                              Date                                Time